# Patient Record
Sex: FEMALE | Race: WHITE | NOT HISPANIC OR LATINO | RURAL
[De-identification: names, ages, dates, MRNs, and addresses within clinical notes are randomized per-mention and may not be internally consistent; named-entity substitution may affect disease eponyms.]

---

## 2024-02-07 ENCOUNTER — OV NP (OUTPATIENT)
Dept: RURAL CLINIC 8 | Facility: CLINIC | Age: 64
End: 2024-02-07
Payer: COMMERCIAL

## 2024-02-07 ENCOUNTER — LAB (OUTPATIENT)
Dept: RURAL CLINIC 8 | Facility: CLINIC | Age: 64
End: 2024-02-07

## 2024-02-07 VITALS
SYSTOLIC BLOOD PRESSURE: 116 MMHG | WEIGHT: 198 LBS | HEART RATE: 69 BPM | HEIGHT: 64 IN | BODY MASS INDEX: 33.8 KG/M2 | DIASTOLIC BLOOD PRESSURE: 69 MMHG | TEMPERATURE: 97.1 F

## 2024-02-07 DIAGNOSIS — R19.4 CHANGE IN BOWEL HABIT: ICD-10-CM

## 2024-02-07 DIAGNOSIS — R19.8 TENESMUS: ICD-10-CM

## 2024-02-07 DIAGNOSIS — N39.41 URGE INCONTINENCE: ICD-10-CM

## 2024-02-07 DIAGNOSIS — E78.2 MIXED HYPERLIPIDEMIA: ICD-10-CM

## 2024-02-07 PROBLEM — 285191000119103: Status: ACTIVE | Noted: 2024-02-07

## 2024-02-07 PROBLEM — 10725009: Status: ACTIVE | Noted: 2024-02-07

## 2024-02-07 PROBLEM — 267434003: Status: ACTIVE | Noted: 2024-02-07

## 2024-02-07 PROBLEM — 53741008: Status: ACTIVE | Noted: 2024-02-07

## 2024-02-07 PROBLEM — 87557004: Status: ACTIVE | Noted: 2024-02-07

## 2024-02-07 PROCEDURE — 99204 OFFICE O/P NEW MOD 45 MIN: CPT | Performed by: INTERNAL MEDICINE

## 2024-02-07 PROCEDURE — 99244 OFF/OP CNSLTJ NEW/EST MOD 40: CPT | Performed by: INTERNAL MEDICINE

## 2024-02-07 RX ORDER — ROSUVASTATIN CALCIUM 20 MG/1
1 TABLET TABLET, COATED ORAL ONCE A DAY
Status: ACTIVE | COMMUNITY

## 2024-02-07 RX ORDER — BISACODYL 5 MG
TAKE 4 TABLET, DELAYED RELEASE (ENTERIC COATED) ORAL
Qty: 4 | OUTPATIENT
Start: 2024-02-07 | End: 2024-02-08

## 2024-02-07 RX ORDER — METOPROLOL TARTRATE 25 MG/1
1 TABLET WITH FOOD TABLET, FILM COATED ORAL TWICE A DAY
Status: ACTIVE | COMMUNITY

## 2024-02-07 RX ORDER — CLOPIDOGREL BISULFATE 75 MG/1
1 TABLET TABLET ORAL ONCE A DAY
Status: ACTIVE | COMMUNITY

## 2024-02-07 RX ORDER — ALPRAZOLAM 0.5 MG/1
1 TABLET TABLET ORAL TWICE A DAY
Status: ACTIVE | COMMUNITY

## 2024-02-07 RX ORDER — EZETIMIBE 10 MG/1
1 TABLET TABLET ORAL ONCE A DAY
Status: ACTIVE | COMMUNITY

## 2024-02-07 RX ORDER — SODIUM, POTASSIUM,MAG SULFATES 17.5-3.13G
177 ML SOLUTION, RECONSTITUTED, ORAL ORAL
Qty: 1 KIT | Refills: 0 | OUTPATIENT
Start: 2024-02-07 | End: 2024-02-08

## 2024-02-07 NOTE — HPI-TODAY'S VISIT:
Pt comes on referral from Dr. Jose Briceno. a copy will be sent to the referring MD.  pt says that about 2 years ago, she would eat and then get "straight diarrhea". that lasted for about 4 weeks and went away. She says May-June of 2023, it started again and continued.  - she says that she will eat a normal meal, then about 10 minutes later she gets cramps and then she will head to the batheroom and rumbling and has "straight diarrhea".  color ranges from orange to brown. no oil.  mixed between liquid and chunky. ws empirically put on Creon.  she says that before this started was a once-a-day stool that was solid.] - she will have a normal stool about one time every two weeks. no nocturnal stools. rare accidents. - on plavix for heart stent two years ago.

## 2024-04-24 ENCOUNTER — COLON (OUTPATIENT)
Dept: RURAL MEDICAL CENTER 4 | Facility: MEDICAL CENTER | Age: 64
End: 2024-04-24